# Patient Record
Sex: FEMALE | Race: WHITE | NOT HISPANIC OR LATINO
[De-identification: names, ages, dates, MRNs, and addresses within clinical notes are randomized per-mention and may not be internally consistent; named-entity substitution may affect disease eponyms.]

---

## 2017-11-09 PROBLEM — Z00.00 ENCOUNTER FOR PREVENTIVE HEALTH EXAMINATION: Status: ACTIVE | Noted: 2017-11-09

## 2017-12-08 ENCOUNTER — APPOINTMENT (OUTPATIENT)
Dept: VASCULAR SURGERY | Facility: CLINIC | Age: 67
End: 2017-12-08
Payer: COMMERCIAL

## 2017-12-08 VITALS — HEIGHT: 61 IN

## 2017-12-08 DIAGNOSIS — I87.2 VENOUS INSUFFICIENCY (CHRONIC) (PERIPHERAL): ICD-10-CM

## 2017-12-08 PROCEDURE — 36475 ENDOVENOUS RF 1ST VEIN: CPT

## 2017-12-08 RX ORDER — ATORVASTATIN CALCIUM 10 MG/1
10 TABLET, FILM COATED ORAL
Qty: 30 | Refills: 0 | Status: ACTIVE | COMMUNITY
Start: 2017-11-14

## 2017-12-14 ENCOUNTER — APPOINTMENT (OUTPATIENT)
Dept: VASCULAR SURGERY | Facility: CLINIC | Age: 67
End: 2017-12-14
Payer: COMMERCIAL

## 2017-12-14 DIAGNOSIS — Z86.39 PERSONAL HISTORY OF OTHER ENDOCRINE, NUTRITIONAL AND METABOLIC DISEASE: ICD-10-CM

## 2017-12-14 DIAGNOSIS — Z78.9 OTHER SPECIFIED HEALTH STATUS: ICD-10-CM

## 2017-12-14 PROCEDURE — 99214 OFFICE O/P EST MOD 30 MIN: CPT

## 2017-12-14 PROCEDURE — 93971 EXTREMITY STUDY: CPT

## 2018-02-15 ENCOUNTER — APPOINTMENT (OUTPATIENT)
Dept: VASCULAR SURGERY | Facility: CLINIC | Age: 68
End: 2018-02-15
Payer: COMMERCIAL

## 2018-02-15 PROCEDURE — 99214 OFFICE O/P EST MOD 30 MIN: CPT

## 2020-04-14 ENCOUNTER — TRANSCRIPTION ENCOUNTER (OUTPATIENT)
Age: 70
End: 2020-04-14

## 2021-09-18 ENCOUNTER — FORM ENCOUNTER (OUTPATIENT)
Age: 71
End: 2021-09-18

## 2021-09-19 ENCOUNTER — TRANSCRIPTION ENCOUNTER (OUTPATIENT)
Age: 71
End: 2021-09-19

## 2021-09-20 ENCOUNTER — APPOINTMENT (OUTPATIENT)
Dept: DISASTER EMERGENCY | Facility: HOSPITAL | Age: 71
End: 2021-09-20

## 2023-06-27 ENCOUNTER — APPOINTMENT (OUTPATIENT)
Dept: HEMATOLOGY ONCOLOGY | Facility: CLINIC | Age: 73
End: 2023-06-27
Payer: MEDICARE

## 2023-06-27 ENCOUNTER — RESULT REVIEW (OUTPATIENT)
Age: 73
End: 2023-06-27

## 2023-06-27 VITALS
WEIGHT: 108 LBS | OXYGEN SATURATION: 96 % | TEMPERATURE: 98 F | BODY MASS INDEX: 21.77 KG/M2 | HEART RATE: 81 BPM | DIASTOLIC BLOOD PRESSURE: 87 MMHG | RESPIRATION RATE: 18 BRPM | SYSTOLIC BLOOD PRESSURE: 158 MMHG | HEIGHT: 59 IN

## 2023-06-27 DIAGNOSIS — Z87.42 PERSONAL HISTORY OF OTHER DISEASES OF THE FEMALE GENITAL TRACT: ICD-10-CM

## 2023-06-27 DIAGNOSIS — F17.200 NICOTINE DEPENDENCE, UNSPECIFIED, UNCOMPLICATED: ICD-10-CM

## 2023-06-27 DIAGNOSIS — Z78.9 OTHER SPECIFIED HEALTH STATUS: ICD-10-CM

## 2023-06-27 PROCEDURE — 99203 OFFICE O/P NEW LOW 30 MIN: CPT | Mod: 25

## 2023-06-27 PROCEDURE — 36415 COLL VENOUS BLD VENIPUNCTURE: CPT

## 2023-06-27 RX ORDER — ACETAMINOPHEN 325 MG/1
TABLET, FILM COATED ORAL
Refills: 0 | Status: ACTIVE | COMMUNITY

## 2023-06-27 NOTE — ASSESSMENT
[FreeTextEntry1] : Mrs. Perlleshi 73-year-old female who is referred after emergency room visit for back pain showing pancytopenia and hypercalcemia.\par She presented to the emergency department at [facility] on 6/21/2023 for new onset lower back pain yes please which lasted for 2 weeks.  She reports that it has currently improved over the last 2 days with Tylenol alone.  She was found on admission to have pancytopenia with WBC of 3.20, hemoglobin of 9.2, and platelet count of 125,000.  She has no previous history of abnormal blood counts.she was also found to have total calcium of 10.8, total protein of 10.9 and albumin of 3.2.  CT of the lumbar spine showed a mild compression fracture of the L4 superior endplate.\par \par Plan:\par \par \par check myeloma w/u\par \par check BMBX\par \par check PET CT after diagnosis confirmed\par \par RTC 1 week\par \par \par \par

## 2023-06-27 NOTE — REVIEW OF SYSTEMS
[Diarrhea: Grade 0] : Diarrhea: Grade 0 [Negative] : Allergic/Immunologic [FreeTextEntry5] : Venous insuff wit left thigh and calf pain discomfort s/p RFA. [FreeTextEntry9] : Lower back pain, better last two days.

## 2023-06-27 NOTE — HISTORY OF PRESENT ILLNESS
[de-identified] : Mrs. Perlleshi 73-year-old female who is referred after emergency room visit for back pain showing pancytopenia and hypercalcemia.\par She presented to the emergency department at [facility] on 6/21/2023 for new onset lower back pain yes please which lasted for 2 weeks.  She reports that it has currently improved over the last 2 days with Tylenol alone.  She was found on admission to have pancytopenia with WBC of 3.20, hemoglobin of 9.2, and platelet count of 125,000.  She has no previous history of abnormal blood counts.she was also found to have total calcium of 10.8, total protein of 10.9 and albumin of 3.2.  CT of the lumbar spine showed a mild compression fracture of the L4 superior endplate.

## 2023-07-07 LAB
ALBUMIN MFR SERPL ELPH: 27.7 %
ALBUMIN SERPL-MCNC: 3 G/DL
ALBUMIN/GLOB SERPL: 0.4 RATIO
ALPHA1 GLOB MFR SERPL ELPH: 1.8 %
ALPHA1 GLOB SERPL ELPH-MCNC: 0.2 G/DL
ALPHA2 GLOB MFR SERPL ELPH: 3.9 %
ALPHA2 GLOB SERPL ELPH-MCNC: 0.4 G/DL
ANA SER IF-ACNC: NEGATIVE
B-GLOBULIN MFR SERPL ELPH: 45.5 %
B-GLOBULIN SERPL ELPH-MCNC: 5 G/DL
DEPRECATED KAPPA LC FREE/LAMBDA SER: 755.54 RATIO
DEPRECATED KAPPA LC FREE/LAMBDA SER: 755.54 RATIO
FERRITIN SERPL-MCNC: 213 NG/ML
GAMMA GLOB FLD ELPH-MCNC: 2.3 G/DL
GAMMA GLOB MFR SERPL ELPH: 21.1 %
IGA SER QL IEP: 4636 MG/DL
IGG SER QL IEP: 158 MG/DL
IGM SER QL IEP: 15 MG/DL
INTERPRETATION SERPL IEP-IMP: NORMAL
IRON SATN MFR SERPL: 33 %
IRON SERPL-MCNC: 78 UG/DL
KAPPA LC CSF-MCNC: 0.24 MG/DL
KAPPA LC CSF-MCNC: 0.24 MG/DL
KAPPA LC SERPL-MCNC: 181.33 MG/DL
KAPPA LC SERPL-MCNC: 181.33 MG/DL
M PROTEIN MFR SERPL ELPH: NORMAL
M PROTEIN SPEC IFE-MCNC: NORMAL
MONOCLON BAND OBS SERPL: NORMAL
PROT SERPL-MCNC: 10.9 G/DL
PROT SERPL-MCNC: 10.9 G/DL
PTH RELATED PROT SERPL-MCNC: <2 PMOL/L
RHEUMATOID FACT SER QL: <10 IU/ML
TIBC SERPL-MCNC: 233 UG/DL
TSH SERPL-ACNC: 1.27 UIU/ML
UIBC SERPL-MCNC: 155 UG/DL
VIT B12 SERPL-MCNC: 294 PG/ML

## 2023-07-12 ENCOUNTER — APPOINTMENT (OUTPATIENT)
Dept: HEMATOLOGY ONCOLOGY | Facility: CLINIC | Age: 73
End: 2023-07-12
Payer: MEDICARE

## 2023-07-12 ENCOUNTER — RESULT REVIEW (OUTPATIENT)
Age: 73
End: 2023-07-12

## 2023-07-12 VITALS — SYSTOLIC BLOOD PRESSURE: 179 MMHG | DIASTOLIC BLOOD PRESSURE: 103 MMHG

## 2023-07-12 VITALS
TEMPERATURE: 97.5 F | WEIGHT: 204.31 LBS | BODY MASS INDEX: 41.19 KG/M2 | SYSTOLIC BLOOD PRESSURE: 189 MMHG | OXYGEN SATURATION: 96 % | HEART RATE: 80 BPM | HEIGHT: 59 IN | RESPIRATION RATE: 18 BRPM | DIASTOLIC BLOOD PRESSURE: 103 MMHG

## 2023-07-12 DIAGNOSIS — M54.9 DORSALGIA, UNSPECIFIED: ICD-10-CM

## 2023-07-12 PROCEDURE — 99214 OFFICE O/P EST MOD 30 MIN: CPT

## 2023-07-12 NOTE — ASSESSMENT
[FreeTextEntry1] : Mrs. Perlleshi 73-year-old female who is referred after emergency room visit for back pain showing pancytopenia and hypercalcemia.\par She presented to the emergency department at [facility] on 6/21/2023 for new onset lower back pain yes please which lasted for 2 weeks.  She reports that it has currently improved over the last 2 days with Tylenol alone.  She was found on admission to have pancytopenia with WBC of 3.20, hemoglobin of 9.2, and platelet count of 125,000.  She has no previous history of abnormal blood counts.she was also found to have total calcium of 10.8, total protein of 10.9 and albumin of 3.2.  CT of the lumbar spine showed a mild compression fracture of the L4 superior endplate.\par \par Plan:\par -Discussed the patient's findings and plan with the patient, her son and daughter at length. \par - We have planned to have Dr. Hu do a bone marrow biopsy urgently on the patient tomorrow at [facility].  The procedure, benefits and possible side effects were discussed at length. \par - The patient's labs findings including pancytopenia, elevated total protein, elevated IgA/ kappa free light chain and  and hypercalcemia were also discussed and the implications of an underlying myelomatous process.\par -PET/CT pending\par - Zometa 4 mg today.  Side effects toxicities and benefits were discussed with the patient and her family members who received written information on the medication.\par -The patient was also scheduled for consultation for possible kyphoplasty with Dr. Gannon at [facility] however the patient's family member canceled it.\par -Refill lidocaine patches per patient request. Trial of tramadol 200 mg given to patient.\par - Labs ordered, drawn in the office, and reviewed\par - History of present illness, review of systems, physical exam and treatment plan reviewed with . \par - Follow-up after bone marrow biopsy.\par \par

## 2023-07-12 NOTE — REVIEW OF SYSTEMS
[Diarrhea: Grade 0] : Diarrhea: Grade 0 [Negative] : Allergic/Immunologic [FreeTextEntry5] : Venous insuff wit left thigh and calf pain discomfort s/p RFA. [FreeTextEntry9] : Lower back pain

## 2023-07-12 NOTE — RESULTS/DATA
[FreeTextEntry1] : WBC 3.43\par Hemoglobin 8.7\par Hematocrit 26.9\par Platelet count 119,000\par Calcium 11.4\par Total protein 11.5

## 2023-07-13 ENCOUNTER — APPOINTMENT (OUTPATIENT)
Dept: HEMATOLOGY ONCOLOGY | Facility: CLINIC | Age: 73
End: 2023-07-13
Payer: MEDICARE

## 2023-07-13 ENCOUNTER — RESULT REVIEW (OUTPATIENT)
Age: 73
End: 2023-07-13

## 2023-07-13 VITALS
WEIGHT: 205.38 LBS | SYSTOLIC BLOOD PRESSURE: 143 MMHG | RESPIRATION RATE: 16 BRPM | HEIGHT: 59 IN | TEMPERATURE: 97.6 F | BODY MASS INDEX: 41.4 KG/M2 | HEART RATE: 92 BPM | DIASTOLIC BLOOD PRESSURE: 83 MMHG | OXYGEN SATURATION: 93 %

## 2023-07-13 DIAGNOSIS — E83.52 HYPERCALCEMIA: ICD-10-CM

## 2023-07-13 DIAGNOSIS — D61.818 OTHER PANCYTOPENIA: ICD-10-CM

## 2023-07-13 PROCEDURE — 38220 DX BONE MARROW ASPIRATIONS: CPT | Mod: LT,59

## 2023-07-13 PROCEDURE — 38221 DX BONE MARROW BIOPSIES: CPT

## 2023-07-13 NOTE — HISTORY OF PRESENT ILLNESS
[de-identified] : She presents for follow-up of hypercalcemia, back pain, pancytopenia and elevated total protein.  \par She presented to the emergency department at [facility] twice for back pain and was given Tylenol and rest and muscle relaxers. \par \par 6/27\par labs reviewed\par pancytopenia and hypercalcemia noted\par SPEP with FERNANDO : Iga Kappa bands. Mspike 1 3.8, MSpike 2 2.0\par IgA 4636, IgG 158, IgM15. \par [place] 181. Lambda 0.24, K:L ratio 755 [de-identified] : Mrs. Perlleshi 73-year-old female who is referred after emergency room visit for back pain showing pancytopenia and hypercalcemia.\par She presented to the emergency department at [facility] on 6/21/2023 for new onset lower back pain yes please which lasted for 2 weeks.  She reports that it has currently improved over the last 2 days with Tylenol alone.  She was found on admission to have pancytopenia with WBC of 3.20, hemoglobin of 9.2, and platelet count of 125,000.  She has no previous history of abnormal blood counts.she was also found to have total calcium of 10.8, total protein of 10.9 and albumin of 3.2.  CT of the lumbar spine showed a mild compression fracture of the L4 superior endplate.

## 2023-07-13 NOTE — ASSESSMENT
[FreeTextEntry1] : #Multiple myeloma\par Reviewed the diagnosis, prognosis and natural history of \par Reviewed the imaging from CT showing L spine compression fracture\par pancytopenia and hypercalcemia noted\par SPEP with FERNANDO : Iga Kappa bands. Mspike 1 3.8, MSpike 2 2.0\par IgA 4636, IgG 158, IgM15. \par [place] 181. Lambda 0.24, K:L ratio 755\par Diagnosis made based off of labs given K:L ratio, M spike of 3.8 and endorgan damage of anemia and hypercalcemia\par BM bx today - will review with patient as soon as we get results.\par PET/CT pending - asked to expedite\par Zometa 4 mg given 7/12/23 for hypercalcemia\par -The patient was also scheduled for consultation for possible kyphoplasty with Dr. Gannon at [facility] however the patient's family member canceled it and I have advised her to call again and ask to get back on the schedule\par -We have briefly discussed the regimen of:\par lenolidamide 25 mg days 1-14 of 21 day cycle. bortezomib SQ days 1,8,15 of 21 day cycle, Faspro 1800 mg/hyluronidase 30,000 units SQ days 1,8, 15 and dex weekly x 4 cycles followed by 2 cycles of faspro q 3 weeks, bortezimib days 1,8,15, rev and dex 20 mg weekly. Will change over from zometa to xgeva 120 mg sq monthly\par I discussed about starting on steroids but daughter would like to wait until the BM bx comes back. \par Will proceed with getting approval to start ASAP.\par \par RTC in 2 weeks to review Bone marrow biopsy

## 2023-07-13 NOTE — PROCEDURE
[Bone Marrow Biopsy] : bone marrow biopsy [Bone Marrow Aspiration] : bone marrow aspiration  [Patient] : the patient [Verbal Consent Obtained] : verbal consent was obtained prior to the procedure [Patient identification verified] : patient identification verified [Procedure verified and consent obtained] : procedure verified and consent obtained [Laterality verified and correct site marked] : laterality verified and correct site marked [Left] : site: left [Correct positioning] : correct positioning [Correct implant and/ or special equipment obtained] : correct impact and/ or special equipment obtained [Prone] : prone [Superior iliac spine was identified] : the superior iliac spine was identified. [The left posterior iliac crest was prepped with betadine and draped, using sterile technique.] : The left posterior iliac crest was prepped with betadine and draped, using sterile technique. [Lidocaine was injected and into the periosteum overlying the site.] : Lidocaine was injected and into the periosteum overlying the site. [Aspirate] : aspirate [Cytogenetics] : cytogenetics [FISH] : FISH [Biopsy] : biopsy [Flow Cytometry] : flow cytometry [] : The patient was instructed to remove the bandage the following AM. The patient may bathe. Acetaminophen may be taken for discomfort, as per package directions.If there are any other problems, the patient was instructed to call the office. The patient verbalized understanding, and is aware of the office contact numbers. [FreeTextEntry1] : Multiple myeloma [FreeTextEntry2] : Felisa Hu DO, FACANNEMARIE, FACP\par Medical Oncology and Hematology\par [facility]\par

## 2023-07-26 ENCOUNTER — APPOINTMENT (OUTPATIENT)
Dept: HEMATOLOGY ONCOLOGY | Facility: CLINIC | Age: 73
End: 2023-07-26
Payer: MEDICARE

## 2023-07-26 VITALS
TEMPERATURE: 97.9 F | SYSTOLIC BLOOD PRESSURE: 150 MMHG | OXYGEN SATURATION: 97 % | BODY MASS INDEX: 41.36 KG/M2 | WEIGHT: 205.13 LBS | DIASTOLIC BLOOD PRESSURE: 82 MMHG | HEIGHT: 59 IN | RESPIRATION RATE: 16 BRPM | HEART RATE: 100 BPM

## 2023-07-26 DIAGNOSIS — C90.00 MULTIPLE MYELOMA NOT HAVING ACHIEVED REMISSION: ICD-10-CM

## 2023-07-26 PROCEDURE — 99215 OFFICE O/P EST HI 40 MIN: CPT | Mod: 25

## 2023-07-26 RX ORDER — CHROMIUM 200 MCG
TABLET ORAL
Refills: 0 | Status: COMPLETED | COMMUNITY
End: 2023-07-26

## 2023-07-26 RX ORDER — TRAMADOL HYDROCHLORIDE 100 MG/1
100 TABLET, EXTENDED RELEASE ORAL
Qty: 12 | Refills: 0 | Status: COMPLETED | COMMUNITY
Start: 2023-07-12 | End: 2023-07-26

## 2023-07-26 RX ORDER — IBUPROFEN 800 MG
TABLET ORAL
Refills: 0 | Status: ACTIVE | COMMUNITY

## 2023-07-26 RX ORDER — LIDOCAINE 40 MG/G
4 PATCH TOPICAL
Qty: 12 | Refills: 1 | Status: COMPLETED | COMMUNITY
Start: 2023-07-12 | End: 2023-07-26

## 2023-07-27 NOTE — ASSESSMENT
[FreeTextEntry1] : #Multiple myeloma\par Reviewed the diagnosis, prognosis and natural history of \par Reviewed the imaging from CT showing L spine compression fracture\par pancytopenia and hypercalcemia noted\par SPEP with FERNANDO : Iga Kappa bands. Mspike 1 3.8, MSpike 2 2.0\par IgA 4636, IgG 158, IgM15. \par [place] 181. Lambda 0.24, K:L ratio 755\par Diagnosis made based off of labs given K:L ratio, M spike of 3.8 and endorgan damage of anemia and hypercalcemia\par BM bx today - will review with patient as soon as we get results.\par Zometa 4 mg given 7/12/23 for hypercalcemia\par -The patient was also scheduled for consultation for possible kyphoplasty with Dr. Gannon at [facility] however the patient's family member canceled it and I have advised her to call again and ask to get back on the schedule\par -We have briefly discussed the regimen of:\par lenolidamide 25 mg days 1-14 of 21 day cycle. bortezomib SQ days 1,8,15 of 21 day cycle, Faspro 1800 mg/hyluronidase 30,000 units SQ days 1,8, 15 and dex weekly x 4 cycles followed by 2 cycles of faspro q 3 weeks, bortezimib days 1,8,15, rev and dex 20 mg weekly. Will change over from zometa to xgeva 120 mg sq monthly\par I discussed about starting on steroids but daughter would like to wait.\par BM bx results show 90% involvement of plasma cell myeloma\par PET CT reviewed.\par \par RTC next week to begin treatment. CBC with diff, cmp, type and screen, myeloma labs and urines, immunoglobulins

## 2023-07-27 NOTE — HISTORY OF PRESENT ILLNESS
[de-identified] : She presents for follow-up of hypercalcemia, back pain, pancytopenia and elevated total protein.  \par Pt reports pain in lower back \par Pt had PET scan 7/20/23 [de-identified] : Mrs. Perlleshi 73-year-old female who is referred after emergency room visit for back pain showing pancytopenia and hypercalcemia.\par She presented to the emergency department at [facility] on 6/21/2023 for new onset lower back pain yes please which lasted for 2 weeks.  She reports that it has currently improved over the last 2 days with Tylenol alone.  She was found on admission to have pancytopenia with WBC of 3.20, hemoglobin of 9.2, and platelet count of 125,000.  She has no previous history of abnormal blood counts.she was also found to have total calcium of 10.8, total protein of 10.9 and albumin of 3.2.  CT of the lumbar spine showed a mild compression fracture of the L4 superior endplate.

## 2023-07-28 NOTE — ASSESSMENT
[FreeTextEntry1] : #Multiple myeloma\par Reviewed the diagnosis, prognosis and natural history of \par Reviewed the imaging from CT showing L spine compression fracture\par pancytopenia and hypercalcemia noted\par SPEP with FERNANDO : Iga Kappa bands. Mspike 1 3.8, MSpike 2 2.0\par IgA 4636, IgG 158, IgM15. \par [place] 181. Lambda 0.24, K:L ratio 755\par Diagnosis made based off of labs given K:L ratio, M spike of 3.8 and endorgan damage of anemia and hypercalcemia\par BM bx today - will review with patient as soon as we get results.\par Zometa 4 mg given 7/12/23 for hypercalcemia\par -The patient was also scheduled for consultation for possible kyphoplasty with Dr. Gannon at [facility] however the patient's family member canceled it and I have advised her to call again and ask to get back on the schedule\par -We have briefly discussed the regimen of:\par lenolidamide 25 mg days 1-14 of 21 day cycle. bortezomib SQ days 1,8,15 of 21 day cycle, Faspro 1800 mg/hyluronidase 30,000 units SQ days 1,8, 15 and dex weekly x 4 cycles followed by 2 cycles of faspro q 3 weeks, bortezimib days 1,8,15, rev and dex 20 mg weekly. Will change over from zometa to xgeva 120 mg sq monthly\par I discussed about starting on steroids but daughter would like to wait.\par BM bx results show 90% involvement of plasma cell myeloma\par PET CT reviewed.\par \par RTC next week to begin treatment. CBC with diff, cmp, type and screen, myeloma labs and urines, immunoglobulins

## 2023-07-28 NOTE — HISTORY OF PRESENT ILLNESS
[de-identified] : She presents for follow-up of hypercalcemia, back pain, pancytopenia and elevated total protein.  \par Pt reports pain in lower back \par Pt had PET scan 7/20/23 [de-identified] : Mrs. Perlleshi 73-year-old female who is referred after emergency room visit for back pain showing pancytopenia and hypercalcemia.\par She presented to the emergency department at [facility] on 6/21/2023 for new onset lower back pain yes please which lasted for 2 weeks.  She reports that it has currently improved over the last 2 days with Tylenol alone.  She was found on admission to have pancytopenia with WBC of 3.20, hemoglobin of 9.2, and platelet count of 125,000.  She has no previous history of abnormal blood counts.she was also found to have total calcium of 10.8, total protein of 10.9 and albumin of 3.2.  CT of the lumbar spine showed a mild compression fracture of the L4 superior endplate.

## 2023-07-28 NOTE — REVIEW OF SYSTEMS
[Diarrhea: Grade 0] : Diarrhea: Grade 0 [Difficulty Walking] : difficulty walking [Negative] : Allergic/Immunologic [FreeTextEntry9] : Lower back pain [de-identified] : difficultly walking d/t back pain

## 2023-08-04 ENCOUNTER — APPOINTMENT (OUTPATIENT)
Dept: HEMATOLOGY ONCOLOGY | Facility: CLINIC | Age: 73
End: 2023-08-04

## 2024-04-17 ENCOUNTER — NON-APPOINTMENT (OUTPATIENT)
Age: 74
End: 2024-04-17

## 2024-04-17 LAB
CALCIUM SERPL-MCNC: 11.1 MG/DL
PARATHYROID HORMONE INTACT: 30 PG/ML
VISCOSITY, SERUM: 4.2

## 2024-04-19 ENCOUNTER — TRANSCRIPTION ENCOUNTER (OUTPATIENT)
Age: 74
End: 2024-04-19